# Patient Record
Sex: FEMALE | Race: WHITE | NOT HISPANIC OR LATINO | URBAN - NONMETROPOLITAN AREA
[De-identification: names, ages, dates, MRNs, and addresses within clinical notes are randomized per-mention and may not be internally consistent; named-entity substitution may affect disease eponyms.]

---

## 2021-09-16 ENCOUNTER — IMPORTED ENCOUNTER (OUTPATIENT)
Dept: URBAN - NONMETROPOLITAN AREA CLINIC 1 | Facility: CLINIC | Age: 51
End: 2021-09-16

## 2021-09-16 PROBLEM — H52.4: Noted: 2021-09-16

## 2021-09-16 PROCEDURE — 92015 DETERMINE REFRACTIVE STATE: CPT

## 2021-09-16 PROCEDURE — 92004 COMPRE OPH EXAM NEW PT 1/>: CPT

## 2021-09-16 NOTE — PATIENT DISCUSSION
Myopia OS/Astigmatism OU w/Presbyopia -  discussed refractive status w/patient -  new spectacle Rx issued -  continue to monitor; 's Notes: MR 9/16/2021DFE 9/16/2021

## 2022-04-15 ASSESSMENT — TONOMETRY
OD_IOP_MMHG: 15
OS_IOP_MMHG: 15

## 2022-04-15 ASSESSMENT — VISUAL ACUITY
OU_SC: 20/20
OS_SC: 20/20
OD_SC: 20/20
OU_CC: 20/20